# Patient Record
Sex: MALE | Race: ASIAN | URBAN - METROPOLITAN AREA
[De-identification: names, ages, dates, MRNs, and addresses within clinical notes are randomized per-mention and may not be internally consistent; named-entity substitution may affect disease eponyms.]

---

## 2019-01-30 ENCOUNTER — OFFICE VISIT (OUTPATIENT)
Dept: FAMILY MEDICINE | Facility: CLINIC | Age: 29
End: 2019-01-30
Payer: COMMERCIAL

## 2019-01-30 VITALS
DIASTOLIC BLOOD PRESSURE: 76 MMHG | HEART RATE: 88 BPM | TEMPERATURE: 98.4 F | WEIGHT: 168.38 LBS | SYSTOLIC BLOOD PRESSURE: 132 MMHG | HEIGHT: 67 IN | BODY MASS INDEX: 26.43 KG/M2 | OXYGEN SATURATION: 99 %

## 2019-01-30 DIAGNOSIS — J06.9 UPPER RESPIRATORY TRACT INFECTION, UNSPECIFIED TYPE: Primary | ICD-10-CM

## 2019-01-30 PROCEDURE — 99203 OFFICE O/P NEW LOW 30 MIN: CPT | Performed by: FAMILY MEDICINE

## 2019-01-30 RX ORDER — GUAIFENESIN 600 MG/1
1200 TABLET, EXTENDED RELEASE ORAL 2 TIMES DAILY
Qty: 40 TABLET | Refills: 0 | Status: SHIPPED | OUTPATIENT
Start: 2019-01-30 | End: 2019-02-09

## 2019-01-30 SDOH — HEALTH STABILITY: MENTAL HEALTH: HOW OFTEN DO YOU HAVE A DRINK CONTAINING ALCOHOL?: NEVER

## 2019-01-30 ASSESSMENT — MIFFLIN-ST. JEOR: SCORE: 1687.37

## 2019-01-30 NOTE — PATIENT INSTRUCTIONS
Orders Placed This Encounter     guaiFENesin (MUCINEX) 600 MG 12 hr tablet     Sig: Take 2 tablets (1,200 mg) by mouth 2 times daily for 10 days     Dispense:  40 tablet     Refill:  0

## 2019-03-12 ENCOUNTER — MYC MEDICAL ADVICE (OUTPATIENT)
Dept: FAMILY MEDICINE | Facility: CLINIC | Age: 29
End: 2019-03-12

## 2020-03-11 ENCOUNTER — HEALTH MAINTENANCE LETTER (OUTPATIENT)
Age: 30
End: 2020-03-11

## 2021-01-03 ENCOUNTER — HEALTH MAINTENANCE LETTER (OUTPATIENT)
Age: 31
End: 2021-01-03

## 2021-04-25 ENCOUNTER — HEALTH MAINTENANCE LETTER (OUTPATIENT)
Age: 31
End: 2021-04-25

## 2021-08-13 NOTE — PROGRESS NOTES
"  SUBJECTIVE:   Yadira Rodriguez is a 28 year old male who presents to clinic today for the following health issues:       Patient is here today Establish Care.  Had seen [facility] in the past but all other records are in [place].    ENT Symptoms             Symptoms: cc Present Absent Comment   Fever/Chills x   A little fever last week   Fatigue   x    Muscle Aches   x    Eye Irritation  ?     Sneezing  ?     Nasal Arash/Drg  x  Sometimes but doesn't last long   Sinus Pressure/Pain   x    Loss of smell       Dental pain  x  Last week did have pain once in teeth   Sore Throat x   Not a lot   Swollen Glands       Ear Pain/Fullness   x    Cough  x  When it comes on it last longer each time   Wheeze       Chest Pain   x    Shortness of breath   x    Rash       Other         Symptom duration:  1 to 1 1/2 weeks   Symptom severity:  mild   Treatments tried:  honey, advil, ginger and gargling with warm water   Contacts:  none             Problem list and histories reviewed & adjusted, as indicated.  Additional history: as documented        Reviewed and updated as needed this visit by clinical staff       Reviewed and updated as needed this visit by Provider         ROS:  Constitutional, HEENT, cardiovascular, pulmonary, gi and gu systems are negative, except as otherwise noted.    OBJECTIVE:     /76 (BP Location: Right arm, Cuff Size: Adult Large)   Pulse 88   Temp 98.4  F (36.9  C) (Oral)   Ht 1.702 m (5' 7\")   Wt 76.4 kg (168 lb 6 oz)   SpO2 99%   BMI 26.37 kg/m    Body mass index is 26.37 kg/m .   GENERAL: healthy, alert and no distress  NECK: no adenopathy, no asymmetry, masses, or scars and thyroid normal to palpation  RESP: lungs clear to auscultation - no rales, rhonchi or wheezes  CV: regular rate and rhythm, normal S1 S2, no S3 or S4, no murmur, click or rub, no peripheral edema and peripheral pulses strong  ABDOMEN: soft, nontender, no hepatosplenomegaly, no masses and bowel sounds normal  MS: no gross " musculoskeletal defects noted, no edema  PSYCH: mentation appears normal, affect normal/bright    Diagnostic Test Results:  none     ASSESSMENT:       PLAN:   (J06.9) Upper respiratory tract infection, unspecified type  (primary encounter diagnosis)  Comment: viral  Plan: guaiFENesin (MUCINEX) 600 MG 12 hr tablet        Follow up if worse          Patient Instructions     Orders Placed This Encounter     guaiFENesin (MUCINEX) 600 MG 12 hr tablet     Sig: Take 2 tablets (1,200 mg) by mouth 2 times daily for 10 days     Dispense:  40 tablet     Refill:  0           Wolfgang Chiang MD, MD  [facility]   13-Aug-2021 00:00

## 2021-08-18 ENCOUNTER — OFFICE VISIT (OUTPATIENT)
Dept: FAMILY MEDICINE | Facility: CLINIC | Age: 31
End: 2021-08-18
Payer: COMMERCIAL

## 2021-08-18 ENCOUNTER — ANCILLARY PROCEDURE (OUTPATIENT)
Dept: GENERAL RADIOLOGY | Facility: CLINIC | Age: 31
End: 2021-08-18
Attending: FAMILY MEDICINE
Payer: COMMERCIAL

## 2021-08-18 VITALS
SYSTOLIC BLOOD PRESSURE: 120 MMHG | BODY MASS INDEX: 25.33 KG/M2 | DIASTOLIC BLOOD PRESSURE: 80 MMHG | HEIGHT: 67 IN | WEIGHT: 161.4 LBS | HEART RATE: 76 BPM | RESPIRATION RATE: 16 BRPM | OXYGEN SATURATION: 98 % | TEMPERATURE: 98.5 F

## 2021-08-18 DIAGNOSIS — G89.29 CHRONIC PAIN OF RIGHT KNEE: ICD-10-CM

## 2021-08-18 DIAGNOSIS — G89.29 CHRONIC PAIN OF RIGHT KNEE: Primary | ICD-10-CM

## 2021-08-18 DIAGNOSIS — M25.561 CHRONIC PAIN OF RIGHT KNEE: Primary | ICD-10-CM

## 2021-08-18 DIAGNOSIS — M22.41 CHONDROMALACIA OF PATELLA, RIGHT: ICD-10-CM

## 2021-08-18 DIAGNOSIS — M25.561 CHRONIC PAIN OF RIGHT KNEE: ICD-10-CM

## 2021-08-18 PROCEDURE — 73562 X-RAY EXAM OF KNEE 3: CPT | Mod: RT | Performed by: RADIOLOGY

## 2021-08-18 PROCEDURE — 99213 OFFICE O/P EST LOW 20 MIN: CPT | Performed by: FAMILY MEDICINE

## 2021-08-18 RX ORDER — NAPROXEN 500 MG/1
500 TABLET ORAL 2 TIMES DAILY WITH MEALS
COMMUNITY
Start: 2021-08-18

## 2021-08-18 ASSESSMENT — MIFFLIN-ST. JEOR: SCORE: 1640.74

## 2021-08-18 NOTE — PROGRESS NOTES
"    Assessment & Plan     Chronic pain of right knee  RICE therapy  Referred to PT  Reviewed XR with pt  - XR Knee Right 3 Views; Future  - Physical Therapy Referral; Future  - naproxen (NAPROSYN) 500 MG tablet; Take 1 tablet (500 mg) by mouth 2 times daily (with meals)    Chondromalacia of patella, right  Reviewed XR  Referred to PT  - naproxen (NAPROSYN) 500 MG tablet; Take 1 tablet (500 mg) by mouth 2 times daily (with meals)    BMI:   Estimated body mass index is 25.28 kg/m  as calculated from the following:    Height as of this encounter: 1.702 m (5' 7\").    Weight as of this encounter: 73.2 kg (161 lb 6.4 oz).   Weight management plan: Discussed healthy diet and exercise guidelines    Return in about 8 weeks (around 10/13/2021).    Smiley Pak MD  [facility]    Adam May is a 31 year old who presents for the following health issues  accompanied by his spouse:  Patient reports right knee is been clicking, stiff for long time.  He reports mostly history unremarkable time.  Otherwise, no pain, no swelling, no weakness.  No previous injury.    Denies calf pain, denies weakness,    Musculoskeletal problem/pain  Onset/Duration: a long time   Description  Location: knee - right, makes a cracking sound , calf muscle gets tight at times    Joint Swelling: no  Redness: no  Pain: no  Warmth: no  Intensity:  mild  Progression of Symptoms:  same and constant  Accompanying signs and symptoms:   Fevers: no  Numbness/tingling/weakness: both legs in morning at times   History  Trauma to the area: yrs ago   Recent illness:  no  Previous similar problem: no  Previous evaluation:  no  Precipitating or alleviating factors:  Aggravating factors include: happens with baring weight   Therapies tried and outcome: does walk for exercise     Review of Systems   Constitutional, HEENT, cardiovascular, pulmonary, gi and gu systems are negative, except as otherwise noted.      Objective    There " were no vitals taken for this visit.  There is no height or weight on file to calculate BMI.  Physical Exam   GENERAL: healthy, alert and no distress  MS: extremities normal- no gross deformities noted  Right knee exam, full range of motion, full extension, flexion, no pain.  Full strength.  Click and grinding with full extension.  SKIN: no suspicious lesions or rashes  PSYCH: mentation appears normal, affect normal/bright    Recent Results (from the past 24 hour(s))   XR Knee Right 3 Views    Narrative    XR KNEE RIGHT 3 VIEWS 8/18/2021 9:17 AM     HISTORY: Chronic pain of right knee; Chronic pain of right knee      Impression    IMPRESSION: Negative exam.    ELEAZAR CUADRA MD         SYSTEM ID:  [id number]

## 2021-08-18 NOTE — PATIENT INSTRUCTIONS
Patient Education     Quad Set for Leg and Knee    This exercise is designed to stretch and strengthen your knee. Before beginning, read through all the instructions. While exercising, breathe normally and use smooth movements. If you feel any pain, stop the exercise. If pain continues, call your healthcare provider.  1. Sit on the floor with one leg straight, the other bent.  2. Flex the foot of your straight leg by pointing your toes toward you. Press the back of your knee into the floor while tightening the muscle on the top of your thigh. Hold for 3 to 5 seconds. Then relax.  3. Repeat 10 to 15 times. Do 3 to 5 sets a day.  Caution    Don t arch your back.    Don t hunch your shoulders.  [employer] last reviewed this educational content on 2/1/2018 2000-2021 [employer]. All rights reserved. This information is not intended as a substitute for professional medical care. Always follow your healthcare professional's instructions.           Patient Education     Rehabilitation for Kneecap (Patella) Problems  Whatever your goals, your healthcare provider may recommend that you see a physical therapist to help you reach them. Whenever possible, the goal of a 3-stage rehab program is to get you back to your normal activities. This is done by helping your knee heal and by exercising your knee and its supporting muscles.     Stage 1    Starting to heal  You ll work with your physical therapist or healthcare provider to reduce pain and swelling. Then you can begin to increase your knee s range of motion.   Stage 2    Improving your knee s function  You ll start flexibility and strengthening exercises. This helps build up the muscles around your knee.   Stage 3    Practicing everyday moves  You ll get your knee and leg ready for everyday activities. You ll work to relearn movements and improve your knee's function.   [employer] last reviewed this educational content on 5/1/2018 2000-2021 [employer]. All rights reserved. This information is not intended as a substitute for professional medical care. Always follow your healthcare professional's instructions.

## 2021-10-10 ENCOUNTER — HEALTH MAINTENANCE LETTER (OUTPATIENT)
Age: 31
End: 2021-10-10

## 2022-05-21 ENCOUNTER — HEALTH MAINTENANCE LETTER (OUTPATIENT)
Age: 32
End: 2022-05-21

## 2022-09-18 ENCOUNTER — HEALTH MAINTENANCE LETTER (OUTPATIENT)
Age: 32
End: 2022-09-18

## 2023-06-04 ENCOUNTER — HEALTH MAINTENANCE LETTER (OUTPATIENT)
Age: 33
End: 2023-06-04

## 2024-07-14 ENCOUNTER — HEALTH MAINTENANCE LETTER (OUTPATIENT)
Age: 34
End: 2024-07-14